# Patient Record
Sex: FEMALE | Race: WHITE
[De-identification: names, ages, dates, MRNs, and addresses within clinical notes are randomized per-mention and may not be internally consistent; named-entity substitution may affect disease eponyms.]

---

## 2019-09-01 ENCOUNTER — HOSPITAL ENCOUNTER (EMERGENCY)
Dept: HOSPITAL 59 - ER | Age: 79
Discharge: HOME | End: 2019-09-01
Payer: COMMERCIAL

## 2019-09-01 DIAGNOSIS — Z79.01: ICD-10-CM
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
## 2019-09-01 DIAGNOSIS — I10: ICD-10-CM

## 2019-09-01 DIAGNOSIS — Z87.891: ICD-10-CM

## 2019-09-01 DIAGNOSIS — S51.811A: Primary | ICD-10-CM

## 2019-09-01 DIAGNOSIS — J44.9: ICD-10-CM

## 2019-09-01 DIAGNOSIS — S81.811A: ICD-10-CM

## 2019-09-01 PROCEDURE — 99283 EMERGENCY DEPT VISIT LOW MDM: CPT

## 2019-09-01 PROCEDURE — 96372 THER/PROPH/DIAG INJ SC/IM: CPT

## 2019-09-01 PROCEDURE — 90715 TDAP VACCINE 7 YRS/> IM: CPT

## 2019-09-01 NOTE — EMERGENCY DEPARTMENT RECORD
History of Present Illness





- General


Stated Complaint: DOG SCRATCH/ON BLOOD THINNER/THIN SKIN


Time Seen by Provider: 19 17:25


Source: Patient


Mode of Arrival: Wheelchair


Limitations: No limitations





- History of Present Illness


Initial Commments: 





80 yo female presents to ED for evaluation following several lacerations 

resulting from the patient's 12-week puppy scratching her skin resulting in (3) 

skin tears, (2) to the right forearm, (1) to the right lower leg.  Patient 

reports that she does take Plavix as well,  applied dressings to the 

wounds prior to arrival to stop the patient's bleeding.  Patient reports that 

her last tetanus was  as well.  Patient denies other injury on examination.


Onset/Timin


-: Minutes(s)


Extremity Location: Right: Forearm, Lower leg


Place: Home


Context: Accidental


Associated Symptoms: None


Treatments Prior to Arrival: Bandage





- [place] Coma Scale


Eye Response: (4) Open spontaneously


Motor Response: (6) Obeys commands


Verbal Response: (5) Oriented


[place] Total: 15





- Related Data


Hx Tetanus Toxoid Vaccination: Yes


Patient Tetanus UTD (within 5 yrs): No


                                Home Medications











 Medication  Instructions  Recorded  Confirmed  Last Taken


 


Alprazolam 0.25 mg PO ASDIR 19


 


Atorvastatin Calcium [Lipitor] 40 mg PO DAILY 19


 


Budesonide/Formoterol Fumarate 1 puff INH DAILY 19





[Symbicort 160-4.5 Mcg Inhaler]    


 


Clopidogrel Bisulfate [Clopidogrel] 75 mg PO DAILY 19


 


Furosemide [Lasix] 20 mg PO DAILY 19


 


Hyoscyamine Sulfate [Levsin-Sl] 0.125 mg SL DAILY 19


 


Levothyroxine Sodium 50 mcg PO DAILY 19


 


Metoprolol Tartrate 50 mg PO DAILY 19


 


Montelukast Sodium 10 mg PO DAILY 19


 


Ranolazine [Ranolazine ER] 500 mg PO BID 19











                                    Allergies











Allergy/AdvReac Type Severity Reaction Status Date / Time


 


amoxicillin Allergy  HIVES Verified 19 17:41


 


bromfenac sodium Allergy  SWOLLEN Verified 19 17:41





[From Bromday]   EYES  


 


gatifloxacin [From Zymaxid] Allergy  SWOLLEN Verified 19 17:41





   EYES  


 


iodine Allergy  ANAPHYLAXIS Verified 19 17:41


 


polymyxin B Allergy  SWOLLEN Verified 19 17:41





   EYES  


 


tobramycin Allergy  SWOLLEN Verified 19 17:41





   EYES  














Review of Systems


Constitutional: Denies: Chills, Fever, Malaise, Night sweats


Eyes: Denies: Eye discharge, Eye pain


ENT: Denies: Congestion, Ear pain, Epistaxis


Respiratory: Denies: Cough, Dyspnea


Cardiovascular: Denies: Chest pain, Dyspnea on exertion


Endocrine: Denies: Fatigue, Heat or cold intolerance


Gastrointestinal: Denies: Abdominal pain, Nausea, Vomiting


Genitourinary: Denies: Incontinence, Retention


Musculoskeletal: Denies: Arthralgia, Back pain


Skin: Reports: Bruising, Other (Skin tears to the right forearm, right lower 

extremity.).  Denies: Change in color, Rash


Neurological: Denies: Abnormal gait, Confusion


Psychiatric: Denies: Anxiety


Hematological/Lymphatic: Reports: Easy bleeding, Easy bruising.  Denies: Anemia,

Blood Clots





Past Medical History





- SOCIAL HISTORY


Smoking Status: Former smoker





- RESPIRATORY


Hx Respiratory Disorders: Yes


Hx Bronchitis: Yes


Hx COPD: Yes (on O2 24/7)


Hx Pneumonia: Yes


Hx Sleep Apnea: Yes





- CARDIOVASCULAR


Hx Cardio Disorders: Yes


Hx Edema: Yes


Hx Hypertension: Yes (gd control with meds)


Hx Vascular Disease: Yes





- NEURO


Hx Neuro Disorders: Yes


Hx Headaches: Yes (occass)





- GI


Hx GI Disorders: Yes


Hx Reflux: Yes





- 


Hx Genitourinary Disorders: No





- ENDOCRINE


Hx Endocrine Disorders: Yes


Hx Thyroid Disease: Yes





- MUSCULOSKELETAL


Hx Musculoskeletal Disorders: Yes


Hx Arthritis: Yes





- PSYCH


Hx Psych Problems: No





- HEMATOLOGY/ONCOLOGY


Hx Hematology/Oncology Disorders: Yes


Hx Bruising: Yes


Hx Cancer: Yes (recent lymphoma dx)





Physical Exam





- General


General Appearance: Alert, Oriented x3, Cooperative, Mild distress


Limitations: No limitations





- Head


Head exam: Atraumatic, Normocephalic, Normal inspection


Head exam detail: negative: Abrasion, Contusion, Erickson's sign, General 

tenderness, Hematoma, Laceration





- Eye


Eye exam: Normal appearance.  negative: Conjunctival injection, Periorbital 

swelling, Periorbital tenderness, Scleral icterus





- ENT


Ear exam: negative: Auricular hematoma, Auricular trauma


Nasal Exam: negative: Active bleeding, Discharge, Dried blood, Foreign body


Mouth exam: negative: Drooling, Laceration, Muffled voice, Tongue elevation





- Neck


Neck exam: Normal inspection.  negative: Meningismus, Tenderness





- Respiratory


Respiratory exam: Normal lung sounds bilaterally.  negative: Respiratory 

distress, Rhonchi, Stridor, Wheezes





- Cardiovascular


Cardiovascular Exam: Regular rate, Normal rhythm, Normal heart sounds





- GI/Abdominal


GI/Abdominal exam: Soft.  negative: Distended, Rebound, Rigid, Tenderness





- Rectal


Rectal exam: Deferred





- 


 exam: Deferred





- Extremities


Extremities exam: Other ((2) skin tears to the right forear, (1) linear 

measuring approiximately 3.5 cm, (1) flap laceration measuring 2.5 cm in length.

 Small skin tear to the right lower leg as well measuring <1.0 cm in length.  No

laceration involves the dermis of any wound.).  negative: Pedal edema, 

Tenderness





- Back


Back exam: Denies: CVA tenderness (R), CVA tenderness (L)





- Neurological


Neurological exam: Alert, Normal gait, Oriented X3





- Psychiatric


Psychiatric exam: Normal affect, Normal mood





- Skin


Skin exam: Normal color.  negative: Abrasion


Type of lesion: negative: abrasion





Course





- Reevaluation(s)


Reevaluation #1: 





19 17:39


Patient was seen and examined.


Patient has (3) skin tears on examination involving the epidermis only.


Following discussion with the patient re: primary closure and the risk of 

infection, will apply wound dressings and initiate treatment with Doxycycline 

(patient is allergic to PCN).


Patient has a follow-up appointment with her PCP Wednesday scheduled, asked to 

follow-up at this time for examination of her wounds.


Patient and her SO verbalize understanding of all instructions, appears stable 

for discharge at this time.





Disposition


Disposition: Discharge


Clinical Impression: 


 Skin tear, Dog scratch





Disposition: Home, Self-Care


Condition: (2) Stable


Instructions:  Animal Bite (ED)


Additional Instructions: 


Return to ED if your symptoms worsen or if you have any concerns.


Doxycylcine as directed.


Follow-up with Dr. Pruvis Wednesday as scheduled for wound check.


Forms:  Patient Portal Access


Time of Disposition: 17:28





Quality





- Quality Measures


Quality Measures: N/A





- Blood Pressure Screening


Does Patient Have Any of the Following: Active Dx of HTN


Blood Pressure Classification: Pre-Hypertensive BP Reading


Systolic Measurement: 144


Diastolic Measurement: 83


Screening for High Blood Pressure: Patient Exclusion, Hx of HTN []